# Patient Record
Sex: FEMALE | Race: BLACK OR AFRICAN AMERICAN | URBAN - METROPOLITAN AREA
[De-identification: names, ages, dates, MRNs, and addresses within clinical notes are randomized per-mention and may not be internally consistent; named-entity substitution may affect disease eponyms.]

---

## 2019-03-09 ENCOUNTER — HOSPITAL ENCOUNTER (OUTPATIENT)
Dept: MAMMOGRAPHY | Age: 64
Discharge: HOME | End: 2019-03-14
Payer: COMMERCIAL

## 2019-03-09 DIAGNOSIS — Z12.31 VISIT FOR SCREENING MAMMOGRAM: ICD-10-CM

## 2019-03-09 PROCEDURE — 77067 SCR MAMMO BI INCL CAD: CPT

## 2022-04-28 ENCOUNTER — OFFICE VISIT (OUTPATIENT)
Dept: ORTHOPEDIC SURGERY | Age: 67
End: 2022-04-28
Payer: MEDICARE

## 2022-04-28 VITALS — HEIGHT: 67 IN | WEIGHT: 172 LBS | BODY MASS INDEX: 27 KG/M2

## 2022-04-28 DIAGNOSIS — M17.11 ARTHRITIS OF RIGHT KNEE: ICD-10-CM

## 2022-04-28 DIAGNOSIS — M79.671 RIGHT FOOT PAIN: ICD-10-CM

## 2022-04-28 DIAGNOSIS — M65.9 SYNOVITIS OF RIGHT ANKLE: Primary | ICD-10-CM

## 2022-04-28 DIAGNOSIS — M16.12 ARTHRITIS OF LEFT HIP: ICD-10-CM

## 2022-04-28 LAB
C-REACTIVE PROTEIN WIDE RANGE: 8.15 MG/L
SEDIMENTATION RATE, ERYTHROCYTE: 12 MM/HR (ref 0–30)

## 2022-04-28 PROCEDURE — 20605 DRAIN/INJ JOINT/BURSA W/O US: CPT | Performed by: ORTHOPAEDIC SURGERY

## 2022-04-28 PROCEDURE — 99204 OFFICE O/P NEW MOD 45 MIN: CPT | Performed by: ORTHOPAEDIC SURGERY

## 2022-04-28 RX ORDER — OXYCODONE HYDROCHLORIDE AND ACETAMINOPHEN 5; 325 MG/1; MG/1
1 TABLET ORAL EVERY 4 HOURS PRN
COMMUNITY
Start: 2022-04-22 | End: 2022-04-29

## 2022-04-28 RX ORDER — LIDOCAINE HYDROCHLORIDE 10 MG/ML
5 INJECTION, SOLUTION INFILTRATION; PERINEURAL ONCE
Status: COMPLETED | OUTPATIENT
Start: 2022-04-28 | End: 2022-04-28

## 2022-04-28 RX ORDER — AMLODIPINE BESYLATE AND BENAZEPRIL HYDROCHLORIDE 5; 40 MG/1; MG/1
CAPSULE ORAL
COMMUNITY
Start: 2022-04-22

## 2022-04-28 RX ORDER — CELECOXIB 100 MG/1
100 CAPSULE ORAL DAILY
COMMUNITY
Start: 2022-04-22

## 2022-04-28 RX ORDER — METHYLPREDNISOLONE ACETATE 40 MG/ML
40 INJECTION, SUSPENSION INTRA-ARTICULAR; INTRALESIONAL; INTRAMUSCULAR; SOFT TISSUE ONCE
Status: COMPLETED | OUTPATIENT
Start: 2022-04-28 | End: 2022-04-28

## 2022-04-28 RX ADMIN — LIDOCAINE HYDROCHLORIDE 5 ML: 10 INJECTION, SOLUTION INFILTRATION; PERINEURAL at 12:10

## 2022-04-28 RX ADMIN — METHYLPREDNISOLONE ACETATE 40 MG: 40 INJECTION, SUSPENSION INTRA-ARTICULAR; INTRALESIONAL; INTRAMUSCULAR; SOFT TISSUE at 12:10

## 2022-04-28 NOTE — PATIENT INSTRUCTIONS
Joint Injection After [address] [facility]  Ary Angel MD    Refer to this sheet in the next few days. These insructions provide you with information on caring for yourself after you have had a joint injection. Your caregiver also may give you more specific instructions. Your treatment has been planned according to current medical practices, but problems sometimes occur. Call your caregiver if you have any problems or questions after your procedure. After any type of joint injection, it is not uncommon to experience:     A temporary increase in pain which should resolve within 2-3 days   Soreness, swelling, or bruising around the injection site   Mild numbness, tingling, or weakness around the injection site caused by the numbing medicine used before or with the injection    It is also possible to experience the following effects associated with the specific agent after injection:     Corticosteroids (these effects are rare):  o Allergic reaction  o Increased blood sugar levels (if you have diabetes and you notice that your blood sugar levels have increased, notify your caregiver)  o Increased blood pressure levels  o Mood swings   Hyaluronic acid in the use of viscosupplementation  o Temporary heat or redness  o Temporary rash and itching  o Increased fluid accumulation in the injected join    These effects all should resolve within a day or two after your procedure. HOME CARE INSTRUCTIONS     Limit yourself to light activity the day of your procedure. Avoid lifting heavy objections, bending, stooping or twisting   Take prescription or over-the-counter pain medication as directed by your caregiver   You may apply ice to your injection site to reduce pain and swelling the day of your procedure.  Ice may be applied 3-4 times:  o Put ice in a plastic bag  o Place a towel between your skin and the bag  o Leave the ice on for no longer than 15-20 minutes each time    FOLLOW-UP INSTRUCTIONS    Please make sure you keep your follow-up appointment as indicated by your physician.

## 2022-04-28 NOTE — PROGRESS NOTES
[facility]  Outpatient Progress Note  Pino Dunne MD    Patient Name: Katharina Gan MRN: [id number]   Age: 73 y.o. YOB: 1955   Sex: female      [address] Complaint   Patient presents with    Ankle Pain     Right ankle saw pcp gout pain seen by [facility] xr/mri boot    Hip Pain     LEFT HIP PAIN LATERAL FOR SEVERAL MONTHS NKI NO IMAGING PER PCP MAY NEED INJ        HISTORY OF PRESENT ILLNESS   Katharina Gan is a 73 y.o. female referred by Dr. Gila Diaz for orthopedic consultation regarding pain in her left hip right knee and right ankle. The patient was treated about 10 years ago for right knee arthritis with intra-articular injection of corticosteroid which she states did help with her pain symptoms. Since that time she has had interval difficulties but nothing which limited her activities. She has developed pain and restriction in range of motion in her left hip gradually over the years. This is becoming more severe limiting her ability to work more than 3 days a week and limiting her ability to walk for recreation and occasionally interferes with her sleep. In addition she has been experiencing pain and swelling in her right ankle. She had been evaluated by Dr. Vinod Hernandez at [place] [address] where she had an MRI scan of the ankle and was told she had \"arthritis\" and possibly gout. She was treated with some medication for gout and dietary modification for her right ankle symptoms never improved. Pain Assessment  Location of Pain: Pelvis  Location Modifiers: Left (HIP)  Quality of Pain: Sharp,Aching  Duration of Pain: Persistent  Aggravating Factors: Other (Comment) (WHEN LAYS ON IT, STIFFNESS WITH WB)  Limiting Behavior: Yes  Result of Injury: No  PAST MEDICAL HISTORY    History reviewed. No pertinent past medical history. PAST SURGICAL HISTORY   History reviewed. No pertinent surgical history.     MEDICATIONS     Current Outpatient Medications   Medication Sig Dispense Refill    amLODIPine-benazepril (LOTREL) 5-40 MG per capsule Take one capsule daily      celecoxib (CELEBREX) 100 MG capsule Take 100 mg by mouth daily      oxyCODONE-acetaminophen (PERCOCET) 5-325 MG per tablet Take 1 tablet by mouth every 4 hours as needed (cuts in half). No current facility-administered medications for this visit. ALLERGIES     Allergies   Allergen Reactions    Codeine Nausea Only    Penicillins      As child       FAMILY HISTORY   History reviewed. No pertinent family history. SOCIAL HISTORY     Social History     Socioeconomic History    Marital status: Single     Spouse name: None    Number of children: None    Years of education: None    Highest education level: None   Occupational History    None   Tobacco Use    Smoking status: Never Smoker    Smokeless tobacco: Never Used   Substance and Sexual Activity    Alcohol use: Yes    Drug use: None    Sexual activity: None   Other Topics Concern    None   Social History Narrative    None     Social Determinants of Health     Financial Resource Strain:     Difficulty of Paying Living Expenses: Not on file   Food Insecurity:     Worried About Running Out of Food in the Last Year: Not on file    Don of Food in the Last Year: Not on file   Transportation Needs:     Lack of Transportation (Medical): Not on file    Lack of Transportation (Non-Medical):  Not on file   Physical Activity:     Days of Exercise per Week: Not on file    Minutes of Exercise per Session: Not on file   Stress:     Feeling of Stress : Not on file   Social Connections:     Frequency of Communication with Friends and Family: Not on file    Frequency of Social Gatherings with Friends and Family: Not on file    Attends [employer] Services: Not on file    Active Member of Clubs or Organizations: Not on file    Attends Club or Organization Meetings: Not on file    Marital Status: Not on file Intimate Partner Violence:     Fear of Current or Ex-Partner: Not on file    Emotionally Abused: Not on file    Physically Abused: Not on file    Sexually Abused: Not on file   Housing Stability:     Unable to Pay for Housing in the Last Year: Not on file    Number of [place] in the Last Year: Not on file    Unstable Housing in the Last Year: Not on file       REVIEW OF SYSTEMS   General: no fever, chills, night sweats, anorexia, malaise, fatigue, or weight change  Hematologic:  no unexplained bleeding or bruising  HEENT:   no nasal congestion, rhinorrhea, sore throat, or facial pain  Respiratory:  no cough, dyspnea, or chest pain  Cardiovascular:  no angina, TRUJILLO, PND, orthopnea, dependent edema, or palpitations  Gastrointestinal:  no nausea, vomiting, diarrhea, constipation, or abdominal pain  Genitourinary:  no urinary urgency, frequency, dysuria, or hematuria  Musculoskeletal: see HPI  Endocrine:  no heat or cold intolerance and no polyphagia, polydipsia, or polyuria  Skin:  no skin eruptions or changing lesions  Neurologic:  no focal weakness, numbness/tingling, tremor, or severe headache. See HPI. See HPI for pertinent positives. PHYSICAL EXAM   Vital Signs:   Vitals:    04/28/22 1045   Weight: 172 lb (78 kg)   Height: 5' 7\" (1.702 m)       General appearance: healthy, alert, no distress  Skin: Skin color, texture, turgor normal. No rashes or lesions  HEENT: atraumatic, normocephalic. PERRL  Respiratory: Unlabored breathing  Lymphatic: No adenopathy   Neuro: Alert and oriented, normal distal sensation, normal bilateral DTRs  Vascular: Normal distal capillary and distal pulses  Muskuloskeletal Exam:  Right knee has a moderate effusion. There is valgus alignment. There is crepitation with range of motion. There is no significant ligament instability.   Left hip has no tenderness to palpation but there is severely restricted range of motion with 0 degrees of internal rotation and 5 degrees of external rotation 90 degrees of flexion to full extension and 20 degrees abduction. She has a coxalgic gait. The right ankle demonstrates mild swelling and an effusion in the tibiotalar joint with nonspecific tenderness to palpation. There is mild discomfort with range of motion which is not significantly restricted. Weightbearing alignment of the ankle hindfoot midfoot and forefoot are normal.         Ref Range & Units 3 mo ago Comments   WBC 3.6 - 10.5 THOU/mcL 6.0     RBC 3.80 - 5.20 MIL/mcL 4.00     HEMOGLOBIN 12.0 - 15.2 g/dL 12.1     HEMATOCRIT 36 - 46 % 36.9     MCV 82 - 97 fL 92.3     MCH 27 - 33 pg 30.3     MCHC 32 - 36 g/dL 32.9     RDW 12.3 - 17.0 % 14.7     PLATELET 715 - 896 THOU/mcL 393 High      MPV 7.4 - 11.5 fL 8.6     ABS.  NEUTROPHIL 1.80 - 7.70 THOU/mcL 3.50     ABS LYMPHS 1.00 - 4.00 THOU/mcL 1.50     ABS MONOS 0.20 - 0.90 THOU/mcL 0.60     ABS EOS 0.03 - 0.45 THOU/mcL 0.40     ABS BASOS 0.00 - 0.20 THOU/mcL 0.10     SEGS % 59     LYMPHOCYTES % 24     MONOCYTES % 10     EOSINOPHIL % 6     BASOPHILS % 1  Tested at [facility] [address] [place]    Specimen Collected: 01/21/22 11:11 AM      Ref Range & Units 3 mo ago Comments   URIC ACID 2.3 - 6.6 mg/dL 3.8  Tested at [facility] obtained and reviewed in office:  Views bilateral feet and ankles standing 3 views    Impression: No significant acute bony abnormality or arthropathy    MRI scan of the right ankle performed at [facility] in February 2022 showed a large ankle joint effusion and osteoedema suggestive of inflammatory process    AP pelvis and lateral x-ray of the left hip show severe degenerative changes with complete collapse of the joint space subchondral sclerosis subchondral cystic changes and marginal osteophyte formation      X-rays of the right knee performed on August 7, 2012 at [facility]    FINDINGS: Bony structure are osteopenic.  A mild degree of osteophytic changes present.  Small joint effusion is noted.  No acute fracture is present. IMPRESSION     Osteophytic change, greatest involvement of the lateral joint compartment. Small joint effusion. IMPRESSION     1. Synovitis of right ankle    2. Right foot pain    3. Arthritis of left hip         PLAN   I had a lengthy discussion with patient today regarding diagnosis and treatment options and recommendations. Given the constellation of symptoms I suspect the patient may have an inflammatory arthritic process, most likely rheumatoid arthritis. I have recommended further evaluation with some screening tests. Her left hip is severely painful with rather significant restriction in range of motion and I do not think that additional conservative measures are wearing to be successful in alleviating her pain symptoms so I am going to refer her to Dr. Mark Ruby for consideration of total hip arthroplasty. Left knee arthritis is currently satisfactorily symptomatically managed. She has right ankle synovitis and an effusion without significant degenerative changes noted on radiographs so I will proceed with intra-articular injection of corticosteroid in the right tibiotalar joint. PROCEDURE     Ankle Injection:  The patient consented to the procedure and a time out was performed before proceeding. The patient was in the supine position, and the right ankle tibial-talar joint was distracted. The skin was prepped in sterile fashion. A 25 gauge needle was advanced into the medial joint. A mixture of 2 mL of 1% Lidocaine and 1 mL of 40mg/mL Depo-Medrol was injected into the joint space. The injection flowed freely and the patient tolerated the procedure well. Post injection expectations were reviewed with the patient. FOLLOWUP     Return in about 2 weeks (around 5/12/2022) for test results, Reevaluation.     Orders Placed This Encounter   Procedures    XR ANKLE RIGHT (MIN 3 VIEWS)     Standing Status:   Future     Number of Occurrences:   1     Standing Expiration Date:   4/28/2023     Order Specific Question:   Reason for exam:     Answer:   pain    XR FOOT RIGHT (MIN 3 VIEWS)     Standing Status:   Future     Number of Occurrences:   1     Standing Expiration Date:   4/28/2023     Order Specific Question:   Reason for exam:     Answer:   pain    XR HIP LEFT (2-3 VIEWS)     Standing Status:   Future     Number of Occurrences:   1     Standing Expiration Date:   4/28/2023     Order Specific Question:   Reason for exam:     Answer:   PAIN    RHEUMATOID FACTOR     Standing Status:   Future     Standing Expiration Date:   4/28/2023    C-Reactive Protein     Standing Status:   Future     Standing Expiration Date:   4/28/2023    Sedimentation Rate     Standing Status:   Future     Standing Expiration Date:   4/28/2023    WARREN Reflex to Antibody Cascade     Standing Status:   Future     Standing Expiration Date:   4/28/2023   Magali Young MD, Orthopedic Surgery, [facility]     Referral Priority:   Routine     Referral Type:   Eval and Treat     Referral Reason:   Specialty Services Required     Referred to Provider:   Richa Anthony MD     Requested Specialty:   Orthopedic Surgery     Number of Visits Requested:   1      No orders of the defined types were placed in this encounter.       Patient was instructed on appropriate use of braces, participation in home exercise programs, healthy lifestyle choices and weight loss as appropriate     Melonie Ibarra MD

## 2022-04-29 LAB
ANA IGG, ELISA: NEGATIVE
RHEUMATOID FACTOR: <10 IU/ML

## 2023-02-02 ENCOUNTER — OFFICE VISIT (OUTPATIENT)
Dept: ORTHOPEDIC SURGERY | Age: 68
End: 2023-02-02
Payer: MEDICARE

## 2023-02-02 VITALS — HEIGHT: 67 IN | BODY MASS INDEX: 27 KG/M2 | WEIGHT: 172 LBS

## 2023-02-02 DIAGNOSIS — R26.89 ANTALGIC GAIT: ICD-10-CM

## 2023-02-02 DIAGNOSIS — M21.061 ACQUIRED GENU VALGUM OF BOTH KNEES: ICD-10-CM

## 2023-02-02 DIAGNOSIS — M21.062 ACQUIRED GENU VALGUM OF BOTH KNEES: ICD-10-CM

## 2023-02-02 DIAGNOSIS — M25.561 RIGHT KNEE PAIN, UNSPECIFIED CHRONICITY: ICD-10-CM

## 2023-02-02 DIAGNOSIS — M17.0 PRIMARY OSTEOARTHRITIS OF BOTH KNEES: Primary | ICD-10-CM

## 2023-02-02 PROCEDURE — 1123F ACP DISCUSS/DSCN MKR DOCD: CPT | Performed by: PHYSICIAN ASSISTANT

## 2023-02-02 PROCEDURE — 20610 DRAIN/INJ JOINT/BURSA W/O US: CPT | Performed by: PHYSICIAN ASSISTANT

## 2023-02-02 PROCEDURE — 99213 OFFICE O/P EST LOW 20 MIN: CPT | Performed by: PHYSICIAN ASSISTANT

## 2023-02-02 RX ORDER — METHYLPREDNISOLONE ACETATE 40 MG/ML
40 INJECTION, SUSPENSION INTRA-ARTICULAR; INTRALESIONAL; INTRAMUSCULAR; SOFT TISSUE ONCE
Status: COMPLETED | OUTPATIENT
Start: 2023-02-02 | End: 2023-02-02

## 2023-02-02 RX ORDER — CELECOXIB 100 MG/1
100 CAPSULE ORAL 2 TIMES DAILY
Qty: 60 CAPSULE | Refills: 0 | Status: SHIPPED | OUTPATIENT
Start: 2023-02-02 | End: 2023-03-04

## 2023-02-02 RX ADMIN — METHYLPREDNISOLONE ACETATE 40 MG: 40 INJECTION, SUSPENSION INTRA-ARTICULAR; INTRALESIONAL; INTRAMUSCULAR; SOFT TISSUE at 11:27

## 2023-02-06 NOTE — PROGRESS NOTES
[facility]  History and Physical      Date:  2023    Name:  Lindy Goodman  Address:  [address] [place] [id number]    :  1955      Age:   78 y.o. Chief Complaint    Knee Pain (Right knee pain & swelling)      History of Present Illness:  Lindy Goodman is a 78 y.o. female who presents for an evaluation of her right knee pain. This patient has a past medical history of bilateral knee pain, right worse than left she has had for many years. This has been worse since last April. She has known end-stage osteoarthritis and her symptoms are worse with prolonged activity. 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 She notes crepitus and pain throughout the joint line and in the patellofemoral joint. In addition, she notes swelling. Her discomfort is described as achy, constant and worse with prolonged activity as well as lying on her right side. Her pain wakes her occasionally at night. Conservative treatment has been utilized including: rest, ice, elevation, bracing, home exercises, activity modification, NSAIDs as needed, and corticosteroid injections. The patient denies any new injury. The patient denies any joint locking, numbness, paresthesias, or weakness. Pain Assessment  Location of Pain: Knee  Location Modifiers: Right  Severity of Pain: 2  Quality of Pain: Aching  Duration of Pain: A few days  Frequency of Pain: Intermittent  Aggravating Factors: Stairs, Walking, Standing  Limiting Behavior: Some  Result of Injury: No  Work-Related Injury: No  Are there other pain locations you wish to document?: No    No past medical history on file. No past surgical history on file. No family history on file.     Social History     Socioeconomic History    Marital status: Single     Spouse name: None    Number of children: None    Years of education: None    Highest education level: None   Tobacco Use    Smoking status: Never    Smokeless tobacco: Never   Substance and Sexual Activity    Alcohol use: Yes       Current Outpatient Medications   Medication Sig Dispense Refill    celecoxib (CELEBREX) 100 MG capsule Take 1 capsule by mouth 2 times daily 60 capsule 0    amLODIPine-benazepril (LOTREL) 5-40 MG per capsule Take one capsule daily       No current facility-administered medications for this visit.       Allergies   Allergen Reactions    Codeine Nausea Only    Penicillins      As child         Review of Systems:  A 14 point review of systems was completed by the patient and is available in the media section of the scanned medical record and was reviewed.        Vital Signs:   Ht 5' 7\" (1.702 m)   Wt 172 lb (78 kg)   BMI 26.94 kg/m²     General Exam:    General: Josee Woods is a healthy and well appearing 67 y.o. year old female who is sitting comfortably in our office in no acute distress.   Neuro: Alert & Oriented x 3,  normal,  no focal deficits noted. Normal mood & affect.  Eyes: Sclera clear  Ears: Normal external ear  Mouth: No oral lesions observed  Pulm: Respirations unlabored and regular   Skin: Warm, well perfused      Knee Examination: Right    Inspection: Swelling identified in the infrapatellar fat pad.  No effusion, ecchymosis, discoloration, erythema, excessive warmth. no gross deformities, no signs of infection.     Palpation: There is patellofemoral crepitus, there is medial and lateral joint line tenderness.  No other osseous or soft tissue tenderness.  Negative calf tenderness    Range of Motion:   0-120 degrees with discomfort in the medial joint line and peripatellar region at terminal flexion. Appropriate patellar mobility.    Strength:  3+/5 quadriceps, 3+/5 hamstrings    Special Tests:  Valgus and varus stress test are stable at 0 and 30°.  Lachman's exhibits a solid endpoint.  Negative posterior drawer.  Negative Homans sign    Gait: Antalgic, with the utilization of  a cane    Alignment: Significant valgus deformity    Neuro: Sensation equal bilateral lower extremities    Vascular: 2+ posterior tibialis pulse      Contralateral Knee Comparison Examination: Left    Inspection: Swelling identified in the infrapatellar fat pad. No effusion, ecchymosis, discoloration, erythema, excessive warmth. no gross deformities, no signs of infection. Palpation: There is patellofemoral crepitus, there is medial and lateral joint line tenderness. No other osseous or soft tissue tenderness. Negative calf tenderness    Range of Motion:   0-120 degrees with discomfort in the medial joint line and peripatellar region at terminal flexion. Appropriate patellar mobility. Strength:  3+/5 quadriceps, 3+/5 hamstrings    Special Tests:  Valgus and varus stress test are stable at 0 and 30°. Lachman's exhibits a solid endpoint. Negative posterior drawer. Negative Homans sign    Gait: Antalgic, with the utilization of a cane    Alignment: Significant valgus deformity    Neuro: Sensation equal bilateral lower extremities    Vascular: 2+ posterior tibialis pulse        Radiology:   Previously obtain imaging reviewed. X-rays obtained and reviewed in office: AP and merchant view of both knees and a lateral of the right. Impression: No fractures, dislocations, visible tumors, or signs of acute trauma. The patient exhibits bone-on-bone osteoarthritis in the lateral compartments of both knees. There's significantly decreased joint spacing in the patellofemoral joints bilaterally. KL grade 4. Patella is riding appropriately in the trochlear groove with no subluxation or tilt noted. Osteophytes throughout. Office Procedures: After a thorough history and physical examination it was determined after the initial assessment that the patient may benefit from a corticosteroid injection. This is in addition to her evaluation and other coordination of care.     Orders Placed This Encounter Procedures    XR KNEE RIGHT (3 VIEWS)     Standing Status:   Future     Number of Occurrences:   1     Standing Expiration Date:   2/2/2024    UT ARTHROCENTESIS ASPIR&/INJ MAJOR JT/BURSA W/O US     After informed consent was provided, the patient was seated on the exam table with the  knee flexed to 90 degrees. The anterolateral aspect of the right knee adjacent to the joint line was prepped with Chlora-prep. The skin and subcutaneous tissues were anesthetized with ethyl chloride spray. A 22-gauge needle was inserted into the right knee and 2 ml of 40 mg/ml DepoMedrol was injected. The needle was withdrawn and the puncture site sealed with a Band-Aid. The patient tolerated the procedure well. Post injection instructions and precautions given and any problems to notify us. Assessment: This patient is a 78 y.o. female presenting to the office for an evaluation of her right knee pain. This patient is being treated conservatively for her end-stage osteoarthritis of both knees. Encounter Diagnoses   Name Primary? Primary osteoarthritis of both knees Yes    Right knee pain, unspecified chronicity     Antalgic gait     Acquired genu valgum of both knees            Medical decision making:  Patient's workup and evaluation were reviewed with the patient in the office today. Imaging was reviewed with the patient. After a thorough history and physical examination it was determined after the initial assessment that the patient may benefit from a corticosteroid injection. This is in addition to her evaluation and other coordination of care. She is otherwise educated on conservative treatment for her condition as detailed below. All the patient's questions were answered while in the clinic. The patient is understanding of all instructions and agrees with the plan.       Treatment Plan:    Observe postinjection precautions  Medical optimization  Activity modification/Rest   Ice 20 minutes ever 1-2 hours PRN  Take medications as prescribed/instructed  Elevation   Lightweight exercise/low impact exercise  Appropriate diet/weight management      Follow up: As needed        This patient exhibits moderate complexity for obtaining an independent history, reviewing past medical records, independent interpretation of diagnostic imaging, and further coordinating care. The patient exhibits low risk given that the patient is being treated conservatively at this time. Vinayak Berger PA-C    Physician Assistant - Certified  [address] [place] [address]    02/06/23 5:48 PM        This dictation was performed with a verbal recognition program [facility]) and it was checked for errors. It is possible that there are still dictated errors within this office note. If so, please bring any errors to my attention for an addendum. All efforts were made to ensure that this office note is accurate.

## 2023-03-30 DIAGNOSIS — M17.0 PRIMARY OSTEOARTHRITIS OF BOTH KNEES: ICD-10-CM

## 2023-03-30 DIAGNOSIS — M25.561 RIGHT KNEE PAIN, UNSPECIFIED CHRONICITY: ICD-10-CM

## 2023-04-04 RX ORDER — CELECOXIB 100 MG/1
100 CAPSULE ORAL 2 TIMES DAILY
Qty: 60 CAPSULE | Refills: 0 | Status: SHIPPED | OUTPATIENT
Start: 2023-04-04 | End: 2023-05-04